# Patient Record
Sex: FEMALE | Race: BLACK OR AFRICAN AMERICAN | Employment: FULL TIME | URBAN - METROPOLITAN AREA
[De-identification: names, ages, dates, MRNs, and addresses within clinical notes are randomized per-mention and may not be internally consistent; named-entity substitution may affect disease eponyms.]

---

## 2017-09-15 PROBLEM — Z87.42 HX OF ABNORMAL CERVICAL PAP SMEAR: Status: ACTIVE | Noted: 2017-09-15

## 2017-09-15 PROBLEM — L91.0 KELOID SCAR: Status: ACTIVE | Noted: 2017-09-15

## 2017-09-15 PROBLEM — L72.3 SEBACEOUS CYST: Status: ACTIVE | Noted: 2017-09-15

## 2017-09-15 PROBLEM — E66.01 MORBID OBESITY DUE TO EXCESS CALORIES (HCC): Status: ACTIVE | Noted: 2017-09-15

## 2018-03-13 ENCOUNTER — TELEPHONE (OUTPATIENT)
Dept: FAMILY MEDICINE CLINIC | Age: 28
End: 2018-03-13

## 2018-03-13 DIAGNOSIS — Z20.828 EXPOSURE TO HERPES: Primary | ICD-10-CM

## 2018-03-20 ENCOUNTER — HOSPITAL ENCOUNTER (OUTPATIENT)
Age: 28
Discharge: HOME | End: 2018-03-20
Payer: COMMERCIAL

## 2018-03-20 DIAGNOSIS — Z20.828 EXPOSURE TO HERPES: ICD-10-CM

## 2018-03-20 PROCEDURE — 86694 HERPES SIMPLEX NES ANTBDY: CPT

## 2018-03-22 LAB — HERPES TYPE I/II IGG COMBINED: 0.23 IV

## 2018-03-26 ENCOUNTER — TELEPHONE (OUTPATIENT)
Dept: FAMILY MEDICINE CLINIC | Age: 28
End: 2018-03-26

## 2018-10-16 ENCOUNTER — OFFICE VISIT (OUTPATIENT)
Dept: FAMILY MEDICINE CLINIC | Age: 28
End: 2018-10-16
Payer: COMMERCIAL

## 2018-10-16 ENCOUNTER — HOSPITAL ENCOUNTER (OUTPATIENT)
Age: 28
Discharge: HOME | End: 2018-10-18
Payer: COMMERCIAL

## 2018-10-16 VITALS
OXYGEN SATURATION: 98 % | DIASTOLIC BLOOD PRESSURE: 85 MMHG | RESPIRATION RATE: 18 BRPM | WEIGHT: 293 LBS | BODY MASS INDEX: 48.82 KG/M2 | SYSTOLIC BLOOD PRESSURE: 133 MMHG | HEIGHT: 65 IN | HEART RATE: 75 BPM

## 2018-10-16 DIAGNOSIS — E66.01 CLASS 3 SEVERE OBESITY DUE TO EXCESS CALORIES WITHOUT SERIOUS COMORBIDITY WITH BODY MASS INDEX (BMI) OF 50.0 TO 59.9 IN ADULT (HCC): ICD-10-CM

## 2018-10-16 DIAGNOSIS — D64.9 ANEMIA, UNSPECIFIED TYPE: ICD-10-CM

## 2018-10-16 DIAGNOSIS — E55.9 VITAMIN D DEFICIENCY: ICD-10-CM

## 2018-10-16 DIAGNOSIS — L72.3 SEBACEOUS CYST: ICD-10-CM

## 2018-10-16 DIAGNOSIS — Z00.00 ANNUAL PHYSICAL EXAM: Primary | ICD-10-CM

## 2018-10-16 LAB
ALBUMIN SERPL-MCNC: 3.9 G/DL (ref 3.5–5.2)
ALP BLD-CCNC: 124 U/L (ref 35–104)
ALT SERPL-CCNC: 9 U/L (ref 0–32)
ANION GAP SERPL CALCULATED.3IONS-SCNC: 11 MMOL/L (ref 7–16)
AST SERPL-CCNC: 23 U/L (ref 0–31)
BASOPHILS ABSOLUTE: 0.02 E9/L (ref 0–0.2)
BASOPHILS RELATIVE PERCENT: 0.3 % (ref 0–2)
BILIRUB SERPL-MCNC: 0.3 MG/DL (ref 0–1.2)
BUN BLDV-MCNC: 14 MG/DL (ref 6–20)
CALCIUM SERPL-MCNC: 9.5 MG/DL (ref 8.6–10.2)
CHLORIDE BLD-SCNC: 103 MMOL/L (ref 98–107)
CHOLESTEROL, TOTAL: 116 MG/DL (ref 0–199)
CO2: 27 MMOL/L (ref 22–29)
CREAT SERPL-MCNC: 0.7 MG/DL (ref 0.5–1)
EOSINOPHILS ABSOLUTE: 0.09 E9/L (ref 0.05–0.5)
EOSINOPHILS RELATIVE PERCENT: 1.1 % (ref 0–6)
GFR AFRICAN AMERICAN: >60
GFR NON-AFRICAN AMERICAN: >60 ML/MIN/1.73
GLUCOSE BLD-MCNC: 85 MG/DL (ref 74–109)
HCT VFR BLD CALC: 37.6 % (ref 34–48)
HDLC SERPL-MCNC: 40 MG/DL
HEMOGLOBIN: 10.8 G/DL (ref 11.5–15.5)
IMMATURE GRANULOCYTES #: 0.04 E9/L
IMMATURE GRANULOCYTES %: 0.5 % (ref 0–5)
IRON SATURATION: 10 % (ref 15–50)
IRON: 33 MCG/DL (ref 37–145)
LDL CHOLESTEROL CALCULATED: 62 MG/DL (ref 0–99)
LYMPHOCYTES ABSOLUTE: 2.48 E9/L (ref 1.5–4)
LYMPHOCYTES RELATIVE PERCENT: 31.5 % (ref 20–42)
MCH RBC QN AUTO: 23.7 PG (ref 26–35)
MCHC RBC AUTO-ENTMCNC: 28.7 % (ref 32–34.5)
MCV RBC AUTO: 82.5 FL (ref 80–99.9)
MONOCYTES ABSOLUTE: 0.47 E9/L (ref 0.1–0.95)
MONOCYTES RELATIVE PERCENT: 6 % (ref 2–12)
NEUTROPHILS ABSOLUTE: 4.78 E9/L (ref 1.8–7.3)
NEUTROPHILS RELATIVE PERCENT: 60.6 % (ref 43–80)
PDW BLD-RTO: 16.2 FL (ref 11.5–15)
PLATELET # BLD: 359 E9/L (ref 130–450)
PMV BLD AUTO: 10.7 FL (ref 7–12)
POTASSIUM SERPL-SCNC: 4.4 MMOL/L (ref 3.5–5)
RBC # BLD: 4.56 E12/L (ref 3.5–5.5)
SODIUM BLD-SCNC: 141 MMOL/L (ref 132–146)
TOTAL IRON BINDING CAPACITY: 344 MCG/DL (ref 250–450)
TOTAL PROTEIN: 8.1 G/DL (ref 6.4–8.3)
TRIGL SERPL-MCNC: 68 MG/DL (ref 0–149)
TSH SERPL DL<=0.05 MIU/L-ACNC: 2.62 UIU/ML (ref 0.27–4.2)
VITAMIN D 25-HYDROXY: 10 NG/ML (ref 30–100)
VLDLC SERPL CALC-MCNC: 14 MG/DL
WBC # BLD: 7.9 E9/L (ref 4.5–11.5)

## 2018-10-16 PROCEDURE — 84443 ASSAY THYROID STIM HORMONE: CPT

## 2018-10-16 PROCEDURE — 99395 PREV VISIT EST AGE 18-39: CPT | Performed by: FAMILY MEDICINE

## 2018-10-16 PROCEDURE — 82306 VITAMIN D 25 HYDROXY: CPT

## 2018-10-16 PROCEDURE — 83540 ASSAY OF IRON: CPT

## 2018-10-16 PROCEDURE — 80061 LIPID PANEL: CPT

## 2018-10-16 PROCEDURE — G8484 FLU IMMUNIZE NO ADMIN: HCPCS | Performed by: FAMILY MEDICINE

## 2018-10-16 PROCEDURE — 80053 COMPREHEN METABOLIC PANEL: CPT

## 2018-10-16 PROCEDURE — 83036 HEMOGLOBIN GLYCOSYLATED A1C: CPT

## 2018-10-16 PROCEDURE — 83550 IRON BINDING TEST: CPT

## 2018-10-16 PROCEDURE — 85025 COMPLETE CBC W/AUTO DIFF WBC: CPT

## 2018-10-16 RX ORDER — MUPIROCIN CALCIUM 20 MG/G
CREAM TOPICAL
Qty: 15 G | Refills: 0 | Status: SHIPPED | OUTPATIENT
Start: 2018-10-16 | End: 2018-11-15

## 2018-10-16 RX ORDER — IBUPROFEN 800 MG/1
800 TABLET ORAL EVERY 8 HOURS PRN
Qty: 90 TABLET | Refills: 1 | Status: SHIPPED | OUTPATIENT
Start: 2018-10-16 | End: 2019-11-15 | Stop reason: SDUPTHER

## 2018-10-16 ASSESSMENT — ENCOUNTER SYMPTOMS
DIARRHEA: 0
CONSTIPATION: 0
ABDOMINAL PAIN: 0
SINUS PRESSURE: 0
COUGH: 0
SHORTNESS OF BREATH: 0
EYE PAIN: 0
SORE THROAT: 0
BACK PAIN: 0

## 2018-10-16 ASSESSMENT — PATIENT HEALTH QUESTIONNAIRE - PHQ9
SUM OF ALL RESPONSES TO PHQ QUESTIONS 1-9: 0
2. FEELING DOWN, DEPRESSED OR HOPELESS: 0
SUM OF ALL RESPONSES TO PHQ QUESTIONS 1-9: 0
SUM OF ALL RESPONSES TO PHQ9 QUESTIONS 1 & 2: 0
1. LITTLE INTEREST OR PLEASURE IN DOING THINGS: 0

## 2018-10-16 NOTE — PROGRESS NOTES
Karishma Urrutia   1990    Chief Complaint:     Chief Complaint   Patient presents with    Established New Doctor       HPI  Karishma Urrutia 33 y.o. who presents as a new patient to establish care. Previous pcp was in [address] [place] [address]. She presents for annual exam.     No previous medical hx. Patient feels well today. She is obese and has been eating one meal daily but does not exercise. She is trying to get back into it. Has sebaceous cyst on back of neck that comes and goes. She did see dr galvin before but does not wish for surgery. Ibuprofen does help. No past medical history on file. Past Surgical History:   Procedure Laterality Date    DILATION AND CURETTAGE OF UTERUS  2017    for abnormal uterine bleeding       Social History     Social History    Marital status: Single     Spouse name: N/A    Number of children: N/A    Years of education: N/A     Social History Main Topics    Smoking status: Never Smoker    Smokeless tobacco: Never Used    Alcohol use No    Drug use: No    Sexual activity: Yes     Partners: Male      Comment: commited relationship     Other Topics Concern    None     Social History Narrative    None       No family history on file. Current Outpatient Prescriptions   Medication Sig Dispense Refill    mupirocin (BACTROBAN) 2 % cream Apply 3 times daily. 15 g 0    ibuprofen (IBU) 800 MG tablet Take 1 tablet by mouth every 8 hours as needed for Pain Take with food. 90 tablet 1     No current facility-administered medications for this visit. No Known Allergies    REVIEW OF SYSTEMS  Review of Systems   Constitutional: Negative for fatigue and fever. HENT: Negative for ear pain, sinus pressure, sneezing and sore throat. Eyes: Negative for pain. Respiratory: Negative for cough and shortness of breath. Cardiovascular: Negative for chest pain and leg swelling. Gastrointestinal: Negative for abdominal pain, constipation and diarrhea.

## 2018-10-17 ENCOUNTER — PATIENT MESSAGE (OUTPATIENT)
Dept: FAMILY MEDICINE CLINIC | Age: 28
End: 2018-10-17

## 2018-10-17 LAB — HBA1C MFR BLD: 5.2 % (ref 4–5.6)

## 2018-10-18 RX ORDER — ERGOCALCIFEROL 1.25 MG/1
50000 CAPSULE ORAL WEEKLY
Qty: 12 CAPSULE | Refills: 0 | Status: SHIPPED | OUTPATIENT
Start: 2018-10-18 | End: 2019-03-22

## 2018-10-18 RX ORDER — FERROUS SULFATE 325(65) MG
325 TABLET ORAL DAILY
Qty: 30 TABLET | Refills: 3 | Status: SHIPPED
Start: 2018-10-18 | End: 2021-03-30

## 2018-11-01 ENCOUNTER — PATIENT MESSAGE (OUTPATIENT)
Dept: FAMILY MEDICINE CLINIC | Age: 28
End: 2018-11-01

## 2018-12-20 ENCOUNTER — OFFICE VISIT (OUTPATIENT)
Dept: FAMILY MEDICINE CLINIC | Age: 28
End: 2018-12-20
Payer: COMMERCIAL

## 2018-12-20 VITALS
HEART RATE: 90 BPM | OXYGEN SATURATION: 100 % | RESPIRATION RATE: 16 BRPM | HEIGHT: 65 IN | TEMPERATURE: 97.1 F | SYSTOLIC BLOOD PRESSURE: 146 MMHG | DIASTOLIC BLOOD PRESSURE: 97 MMHG | WEIGHT: 293 LBS | BODY MASS INDEX: 48.82 KG/M2

## 2018-12-20 DIAGNOSIS — K29.00 ACUTE GASTRITIS WITHOUT HEMORRHAGE, UNSPECIFIED GASTRITIS TYPE: Primary | ICD-10-CM

## 2018-12-20 DIAGNOSIS — E66.01 MORBID OBESITY DUE TO EXCESS CALORIES (HCC): ICD-10-CM

## 2018-12-20 DIAGNOSIS — R10.13 EPIGASTRIC PAIN: ICD-10-CM

## 2018-12-20 PROCEDURE — G8417 CALC BMI ABV UP PARAM F/U: HCPCS | Performed by: FAMILY MEDICINE

## 2018-12-20 PROCEDURE — G8484 FLU IMMUNIZE NO ADMIN: HCPCS | Performed by: FAMILY MEDICINE

## 2018-12-20 PROCEDURE — G8427 DOCREV CUR MEDS BY ELIG CLIN: HCPCS | Performed by: FAMILY MEDICINE

## 2018-12-20 PROCEDURE — 99214 OFFICE O/P EST MOD 30 MIN: CPT | Performed by: FAMILY MEDICINE

## 2018-12-20 PROCEDURE — 1036F TOBACCO NON-USER: CPT | Performed by: FAMILY MEDICINE

## 2018-12-20 RX ORDER — PANTOPRAZOLE SODIUM 40 MG/1
40 TABLET, DELAYED RELEASE ORAL
Qty: 14 TABLET | Refills: 3 | Status: SHIPPED | OUTPATIENT
Start: 2018-12-20 | End: 2019-02-11 | Stop reason: SDUPTHER

## 2018-12-20 ASSESSMENT — ENCOUNTER SYMPTOMS
CONSTIPATION: 0
ABDOMINAL PAIN: 1
SINUS PRESSURE: 0
NAUSEA: 1
COUGH: 0
SORE THROAT: 0
BACK PAIN: 0
SHORTNESS OF BREATH: 0
EYE PAIN: 0
DIARRHEA: 0

## 2019-02-06 ENCOUNTER — OFFICE VISIT (OUTPATIENT)
Dept: FAMILY MEDICINE CLINIC | Age: 29
End: 2019-02-06
Payer: COMMERCIAL

## 2019-02-06 ENCOUNTER — HOSPITAL ENCOUNTER (OUTPATIENT)
Age: 29
Discharge: HOME | End: 2019-02-08
Payer: COMMERCIAL

## 2019-02-06 VITALS
OXYGEN SATURATION: 98 % | SYSTOLIC BLOOD PRESSURE: 140 MMHG | HEIGHT: 65 IN | BODY MASS INDEX: 48.82 KG/M2 | WEIGHT: 293 LBS | TEMPERATURE: 98.4 F | DIASTOLIC BLOOD PRESSURE: 76 MMHG | RESPIRATION RATE: 16 BRPM

## 2019-02-06 DIAGNOSIS — R10.9 FLANK PAIN: Primary | ICD-10-CM

## 2019-02-06 DIAGNOSIS — R09.82 PND (POST-NASAL DRIP): ICD-10-CM

## 2019-02-06 DIAGNOSIS — R10.9 FLANK PAIN: ICD-10-CM

## 2019-02-06 DIAGNOSIS — M72.2 PLANTAR FASCIITIS OF LEFT FOOT: ICD-10-CM

## 2019-02-06 LAB
BACTERIA: ABNORMAL /HPF
BILIRUBIN URINE: NEGATIVE
BLOOD, URINE: NEGATIVE
CLARITY: CLEAR
COLOR: YELLOW
EPITHELIAL CELLS, UA: ABNORMAL /HPF
GLUCOSE URINE: NEGATIVE MG/DL
KETONES, URINE: NEGATIVE MG/DL
LEUKOCYTE ESTERASE, URINE: ABNORMAL
NITRITE, URINE: NEGATIVE
PH UA: 6 (ref 5–9)
PROTEIN UA: NEGATIVE MG/DL
RBC UA: ABNORMAL /HPF (ref 0–2)
SPECIFIC GRAVITY UA: 1.02 (ref 1–1.03)
UROBILINOGEN, URINE: 0.2 E.U./DL
WBC UA: ABNORMAL /HPF (ref 0–5)

## 2019-02-06 PROCEDURE — 99214 OFFICE O/P EST MOD 30 MIN: CPT | Performed by: FAMILY MEDICINE

## 2019-02-06 PROCEDURE — G8427 DOCREV CUR MEDS BY ELIG CLIN: HCPCS | Performed by: FAMILY MEDICINE

## 2019-02-06 PROCEDURE — G8417 CALC BMI ABV UP PARAM F/U: HCPCS | Performed by: FAMILY MEDICINE

## 2019-02-06 PROCEDURE — 81003 URINALYSIS AUTO W/O SCOPE: CPT | Performed by: FAMILY MEDICINE

## 2019-02-06 PROCEDURE — 1036F TOBACCO NON-USER: CPT | Performed by: FAMILY MEDICINE

## 2019-02-06 PROCEDURE — 81001 URINALYSIS AUTO W/SCOPE: CPT

## 2019-02-06 PROCEDURE — G8484 FLU IMMUNIZE NO ADMIN: HCPCS | Performed by: FAMILY MEDICINE

## 2019-02-06 ASSESSMENT — ENCOUNTER SYMPTOMS
COUGH: 0
BACK PAIN: 0
SORE THROAT: 0
EYE PAIN: 0
NAUSEA: 0
SINUS PRESSURE: 0
ABDOMINAL PAIN: 1
DIARRHEA: 0
SHORTNESS OF BREATH: 0
CONSTIPATION: 0

## 2019-02-06 ASSESSMENT — PATIENT HEALTH QUESTIONNAIRE - PHQ9
SUM OF ALL RESPONSES TO PHQ QUESTIONS 1-9: 0
SUM OF ALL RESPONSES TO PHQ QUESTIONS 1-9: 0
2. FEELING DOWN, DEPRESSED OR HOPELESS: 0
SUM OF ALL RESPONSES TO PHQ9 QUESTIONS 1 & 2: 0
1. LITTLE INTEREST OR PLEASURE IN DOING THINGS: 0

## 2019-02-07 RX ORDER — CEPHALEXIN 500 MG/1
500 CAPSULE ORAL 2 TIMES DAILY
Qty: 10 CAPSULE | Refills: 0 | Status: SHIPPED | OUTPATIENT
Start: 2019-02-07 | End: 2019-03-22

## 2019-02-11 RX ORDER — PANTOPRAZOLE SODIUM 40 MG/1
TABLET, DELAYED RELEASE ORAL
Qty: 14 TABLET | Refills: 3 | Status: SHIPPED | OUTPATIENT
Start: 2019-02-11 | End: 2019-03-22

## 2019-03-22 ENCOUNTER — HOSPITAL ENCOUNTER (OUTPATIENT)
Age: 29
Discharge: HOME | End: 2019-03-24
Payer: COMMERCIAL

## 2019-03-22 ENCOUNTER — HOSPITAL ENCOUNTER (OUTPATIENT)
Dept: GENERAL RADIOLOGY | Age: 29
Discharge: HOME | End: 2019-03-24
Payer: COMMERCIAL

## 2019-03-22 ENCOUNTER — OFFICE VISIT (OUTPATIENT)
Dept: FAMILY MEDICINE CLINIC | Age: 29
End: 2019-03-22
Payer: COMMERCIAL

## 2019-03-22 VITALS
WEIGHT: 293 LBS | DIASTOLIC BLOOD PRESSURE: 74 MMHG | HEART RATE: 84 BPM | BODY MASS INDEX: 48.82 KG/M2 | RESPIRATION RATE: 18 BRPM | OXYGEN SATURATION: 95 % | TEMPERATURE: 98.2 F | HEIGHT: 65 IN | SYSTOLIC BLOOD PRESSURE: 134 MMHG

## 2019-03-22 DIAGNOSIS — R10.9 FLANK PAIN: Primary | ICD-10-CM

## 2019-03-22 DIAGNOSIS — R10.9 FLANK PAIN: ICD-10-CM

## 2019-03-22 PROCEDURE — G8427 DOCREV CUR MEDS BY ELIG CLIN: HCPCS | Performed by: FAMILY MEDICINE

## 2019-03-22 PROCEDURE — 74018 RADEX ABDOMEN 1 VIEW: CPT

## 2019-03-22 PROCEDURE — G8417 CALC BMI ABV UP PARAM F/U: HCPCS | Performed by: FAMILY MEDICINE

## 2019-03-22 PROCEDURE — 99213 OFFICE O/P EST LOW 20 MIN: CPT | Performed by: FAMILY MEDICINE

## 2019-03-22 PROCEDURE — 1036F TOBACCO NON-USER: CPT | Performed by: FAMILY MEDICINE

## 2019-03-22 PROCEDURE — G8484 FLU IMMUNIZE NO ADMIN: HCPCS | Performed by: FAMILY MEDICINE

## 2019-03-22 RX ORDER — CYCLOBENZAPRINE HCL 5 MG
5 TABLET ORAL 2 TIMES DAILY PRN
Qty: 30 TABLET | Refills: 0 | Status: SHIPPED | OUTPATIENT
Start: 2019-03-22 | End: 2019-04-01

## 2019-03-26 ASSESSMENT — ENCOUNTER SYMPTOMS
CONSTIPATION: 0
BACK PAIN: 0
SINUS PRESSURE: 0
EYE PAIN: 0
SHORTNESS OF BREATH: 0
COUGH: 0
SORE THROAT: 0
DIARRHEA: 0
NAUSEA: 0
ABDOMINAL PAIN: 0

## 2019-04-24 ENCOUNTER — PATIENT MESSAGE (OUTPATIENT)
Dept: FAMILY MEDICINE CLINIC | Age: 29
End: 2019-04-24

## 2019-04-24 RX ORDER — FLUCONAZOLE 150 MG/1
150 TABLET ORAL ONCE
Qty: 1 TABLET | Refills: 0 | Status: SHIPPED | OUTPATIENT
Start: 2019-04-24 | End: 2019-04-24

## 2019-04-24 NOTE — TELEPHONE ENCOUNTER
From: Goldie Lopez  To: Lesley Poster, DO  Sent: 4/24/2019 11:11 AM EDT  Subject: Prescription Question    Question for the doctor- , I used a new fabric softener which ended up giving me a yeast infection. Im 100 % sure! Doesn't take much to throw me off. . but I'm unable to get an appointment because of my work schedule, I want to see if  can send me a rx for 2 Fluconazole tablets and also 2 pills of Azithromycin. It seems to be the only thing that works for me!      Thanks   Melissa Giles

## 2019-08-01 ENCOUNTER — OFFICE VISIT (OUTPATIENT)
Dept: FAMILY MEDICINE CLINIC | Age: 29
End: 2019-08-01
Payer: COMMERCIAL

## 2019-08-01 ENCOUNTER — HOSPITAL ENCOUNTER (OUTPATIENT)
Age: 29
Discharge: HOME | End: 2019-08-03
Payer: COMMERCIAL

## 2019-08-01 VITALS
BODY MASS INDEX: 48.82 KG/M2 | RESPIRATION RATE: 14 BRPM | WEIGHT: 293 LBS | OXYGEN SATURATION: 98 % | DIASTOLIC BLOOD PRESSURE: 85 MMHG | SYSTOLIC BLOOD PRESSURE: 134 MMHG | HEART RATE: 76 BPM | HEIGHT: 65 IN

## 2019-08-01 DIAGNOSIS — T14.8XXA BRUISING: Primary | ICD-10-CM

## 2019-08-01 DIAGNOSIS — T14.8XXA BRUISING: ICD-10-CM

## 2019-08-01 LAB
BASOPHILS ABSOLUTE: 0.02 E9/L (ref 0–0.2)
BASOPHILS RELATIVE PERCENT: 0.3 % (ref 0–2)
EOSINOPHILS ABSOLUTE: 0.09 E9/L (ref 0.05–0.5)
EOSINOPHILS RELATIVE PERCENT: 1.2 % (ref 0–6)
HCT VFR BLD CALC: 37.6 % (ref 34–48)
HEMOGLOBIN: 11.2 G/DL (ref 11.5–15.5)
IMMATURE GRANULOCYTES #: 0.02 E9/L
IMMATURE GRANULOCYTES %: 0.3 % (ref 0–5)
LYMPHOCYTES ABSOLUTE: 2.16 E9/L (ref 1.5–4)
LYMPHOCYTES RELATIVE PERCENT: 28.7 % (ref 20–42)
MCH RBC QN AUTO: 24.6 PG (ref 26–35)
MCHC RBC AUTO-ENTMCNC: 29.8 % (ref 32–34.5)
MCV RBC AUTO: 82.5 FL (ref 80–99.9)
MONOCYTES ABSOLUTE: 0.38 E9/L (ref 0.1–0.95)
MONOCYTES RELATIVE PERCENT: 5 % (ref 2–12)
NEUTROPHILS ABSOLUTE: 4.86 E9/L (ref 1.8–7.3)
NEUTROPHILS RELATIVE PERCENT: 64.5 % (ref 43–80)
PDW BLD-RTO: 15.9 FL (ref 11.5–15)
PLATELET # BLD: 323 E9/L (ref 130–450)
PMV BLD AUTO: 10.7 FL (ref 7–12)
RBC # BLD: 4.56 E12/L (ref 3.5–5.5)
WBC # BLD: 7.5 E9/L (ref 4.5–11.5)

## 2019-08-01 PROCEDURE — G8417 CALC BMI ABV UP PARAM F/U: HCPCS | Performed by: FAMILY MEDICINE

## 2019-08-01 PROCEDURE — G8427 DOCREV CUR MEDS BY ELIG CLIN: HCPCS | Performed by: FAMILY MEDICINE

## 2019-08-01 PROCEDURE — 99213 OFFICE O/P EST LOW 20 MIN: CPT | Performed by: FAMILY MEDICINE

## 2019-08-01 PROCEDURE — 85025 COMPLETE CBC W/AUTO DIFF WBC: CPT

## 2019-08-01 PROCEDURE — 1036F TOBACCO NON-USER: CPT | Performed by: FAMILY MEDICINE

## 2019-08-01 ASSESSMENT — ENCOUNTER SYMPTOMS
SORE THROAT: 0
SHORTNESS OF BREATH: 0
DIARRHEA: 0
SINUS PRESSURE: 0
CONSTIPATION: 0
EYE PAIN: 0
ABDOMINAL PAIN: 0
COUGH: 0
BACK PAIN: 0

## 2019-08-01 NOTE — PROGRESS NOTES
Jarod Jacobson  : 1990    Chief Complaint:     Chief Complaint   Patient presents with    Bleeding/Bruising     right leg       HPI  Jarod Jacobson 30 y.o. presents for   Chief Complaint   Patient presents with    Bleeding/Bruising     right leg     She admits to bruising on her right leg that is been there for about 3 months. She states she does not remember hitting this. She has no other bruises anywhere else. She has never had issues with this before. She is concerned and she wants this to go away. This is not painful. There is no family history of bruising or bleeding. All questions were answered to patients satisfaction. History reviewed. No pertinent past medical history.     Past Surgical History:   Procedure Laterality Date    DILATION AND CURETTAGE OF UTERUS  2017    for abnormal uterine bleeding       Social History     Socioeconomic History    Marital status: Single     Spouse name: None    Number of children: None    Years of education: None    Highest education level: None   Occupational History    None   Social Needs    Financial resource strain: None    Food insecurity:     Worry: None     Inability: None    Transportation needs:     Medical: None     Non-medical: None   Tobacco Use    Smoking status: Never Smoker    Smokeless tobacco: Never Used   Substance and Sexual Activity    Alcohol use: No    Drug use: No    Sexual activity: Yes     Partners: Male     Comment: commited relationship   Lifestyle    Physical activity:     Days per week: None     Minutes per session: None    Stress: None   Relationships    Social connections:     Talks on phone: None     Gets together: None     Attends Latter day service: None     Active member of club or organization: None     Attends meetings of clubs or organizations: None     Relationship status: None    Intimate partner violence:     Fear of current or ex partner: None     Emotionally abused: None

## 2019-08-01 NOTE — PATIENT INSTRUCTIONS
Patient Education        Superficial Thrombophlebitis: Care Instructions  Your Care Instructions  Superficial thrombophlebitis is inflammation in a vein where a blood clot has formed close to the surface of the skin. You may be able to feel the clot as a firm lump under the skin. The skin over the clot can become red, tender, and warm to the touch. Blood clots in veins close to the skin's surface usually are not serious and often can be treated at home. Sometimes superficial thrombophlebitis spreads to a deeper vein (deep vein thrombosis, or DVT). These deeper clots can be serious, even life-threatening. It is very important that you follow your doctor's instructions, keep all follow-up appointments, and watch for new or worsening symptoms of a clot. Follow-up care is a key part of your treatment and safety. Be sure to make and go to all appointments, and call your doctor if you are having problems. It's also a good idea to know your test results and keep a list of the medicines you take. How can you care for yourself at home? · Take your medicines exactly as prescribed. Call your doctor if you think you are having a problem with your medicine. You will get more details on the specific medicines your doctor prescribes. · Prop up the sore leg or arm on a pillow anytime you sit or lie down. Try to keep it above the level of your heart. To prevent thrombophlebitis  · Exercise. Keep blood moving in your legs to keep new clots from forming. · Get up out of bed as soon as possible after an illness or surgery. · Do not smoke. If you need help quitting, talk to your doctor about stop-smoking programs and medicines. These can increase your chances of quitting for good. · Ask your doctor about compression stockings. These may help prevent blood clots from forming in your legs. But there are different types of stockings, and they need to fit right. So your doctor will recommend what you need.   When should you call for

## 2019-08-02 LAB — PATHOLOGIST REVIEW: NORMAL

## 2019-09-01 ENCOUNTER — HOSPITAL ENCOUNTER (EMERGENCY)
Age: 29
Discharge: HOME | End: 2019-09-01
Payer: COMMERCIAL

## 2019-09-01 VITALS
WEIGHT: 293 LBS | DIASTOLIC BLOOD PRESSURE: 94 MMHG | TEMPERATURE: 98.2 F | SYSTOLIC BLOOD PRESSURE: 187 MMHG | HEIGHT: 65 IN | RESPIRATION RATE: 16 BRPM | BODY MASS INDEX: 48.82 KG/M2 | HEART RATE: 96 BPM | OXYGEN SATURATION: 99 %

## 2019-09-01 DIAGNOSIS — N93.8 DUB (DYSFUNCTIONAL UTERINE BLEEDING): Primary | ICD-10-CM

## 2019-09-01 LAB
BASOPHILS ABSOLUTE: 0.03 E9/L (ref 0–0.2)
BASOPHILS RELATIVE PERCENT: 0.3 % (ref 0–2)
EOSINOPHILS ABSOLUTE: 0.09 E9/L (ref 0.05–0.5)
EOSINOPHILS RELATIVE PERCENT: 0.9 % (ref 0–6)
HCT VFR BLD CALC: 33 % (ref 34–48)
HEMOGLOBIN: 10.1 G/DL (ref 11.5–15.5)
IMMATURE GRANULOCYTES #: 0.03 E9/L
IMMATURE GRANULOCYTES %: 0.3 % (ref 0–5)
LYMPHOCYTES ABSOLUTE: 3.49 E9/L (ref 1.5–4)
LYMPHOCYTES RELATIVE PERCENT: 33.9 % (ref 20–42)
MCH RBC QN AUTO: 24.9 PG (ref 26–35)
MCHC RBC AUTO-ENTMCNC: 30.6 % (ref 32–34.5)
MCV RBC AUTO: 81.5 FL (ref 80–99.9)
MONOCYTES ABSOLUTE: 0.6 E9/L (ref 0.1–0.95)
MONOCYTES RELATIVE PERCENT: 5.8 % (ref 2–12)
NEUTROPHILS ABSOLUTE: 6.04 E9/L (ref 1.8–7.3)
NEUTROPHILS RELATIVE PERCENT: 58.8 % (ref 43–80)
PDW BLD-RTO: 15.8 FL (ref 11.5–15)
PLATELET # BLD: 303 E9/L (ref 130–450)
PMV BLD AUTO: 9.9 FL (ref 7–12)
RBC # BLD: 4.05 E12/L (ref 3.5–5.5)
WBC # BLD: 10.3 E9/L (ref 4.5–11.5)

## 2019-09-01 PROCEDURE — 99284 EMERGENCY DEPT VISIT MOD MDM: CPT

## 2019-09-01 PROCEDURE — 85025 COMPLETE CBC W/AUTO DIFF WBC: CPT

## 2019-09-02 NOTE — ED PROVIDER NOTES
Making:    Her symptoms remain completely resolved. CBC was evaluated her hemoglobin is normal.  She was provided with information to follow-up with OB/GYN and advised to follow-up with    Counseling: The emergency provider has spoken with the patient and discussed todays results, in addition to providing specific details for the plan of care and counseling regarding the diagnosis and prognosis.   Questions are answered at this time and they are agreeable with the plan.      --------------------------------- IMPRESSION AND DISPOSITION ---------------------------------    IMPRESSION  1. DUB (dysfunctional uterine bleeding)        DISPOSITION  Disposition: Discharge to home  Patient condition is good                 RICHARD Wang CNP  09/02/19 0457

## 2019-09-06 ENCOUNTER — OFFICE VISIT (OUTPATIENT)
Dept: FAMILY MEDICINE CLINIC | Age: 29
End: 2019-09-06
Payer: COMMERCIAL

## 2019-09-06 VITALS
DIASTOLIC BLOOD PRESSURE: 64 MMHG | RESPIRATION RATE: 14 BRPM | HEART RATE: 84 BPM | OXYGEN SATURATION: 99 % | SYSTOLIC BLOOD PRESSURE: 118 MMHG | BODY MASS INDEX: 48.82 KG/M2 | HEIGHT: 65 IN | WEIGHT: 293 LBS

## 2019-09-06 DIAGNOSIS — E66.01 MORBID OBESITY DUE TO EXCESS CALORIES (HCC): ICD-10-CM

## 2019-09-06 DIAGNOSIS — R03.0 ELEVATED BP WITHOUT DIAGNOSIS OF HYPERTENSION: Primary | ICD-10-CM

## 2019-09-06 PROCEDURE — G8427 DOCREV CUR MEDS BY ELIG CLIN: HCPCS | Performed by: FAMILY MEDICINE

## 2019-09-06 PROCEDURE — G8417 CALC BMI ABV UP PARAM F/U: HCPCS | Performed by: FAMILY MEDICINE

## 2019-09-06 PROCEDURE — 1036F TOBACCO NON-USER: CPT | Performed by: FAMILY MEDICINE

## 2019-09-06 PROCEDURE — 99213 OFFICE O/P EST LOW 20 MIN: CPT | Performed by: FAMILY MEDICINE

## 2019-09-06 ASSESSMENT — ENCOUNTER SYMPTOMS
SORE THROAT: 0
SINUS PRESSURE: 0
CONSTIPATION: 0
EYE PAIN: 0
ABDOMINAL PAIN: 0
SHORTNESS OF BREATH: 0
DIARRHEA: 0
BACK PAIN: 0
COUGH: 0

## 2019-09-06 NOTE — PROGRESS NOTES
reviewed. No pertinent family history. Current Outpatient Medications   Medication Sig Dispense Refill    ferrous sulfate (SHARI-JIMMIE) 325 (65 Fe) MG tablet Take 1 tablet by mouth daily 30 tablet 3    ibuprofen (IBU) 800 MG tablet Take 1 tablet by mouth every 8 hours as needed for Pain Take with food. 90 tablet 1     No current facility-administered medications for this visit. No Known Allergies    Health Maintenance Due   Topic Date Due    Varicella Vaccine (1 of 2 - 13+ 2-dose series) 04/01/2004    HIV screen  04/01/2006    DTaP/Tdap/Td vaccine (1 - Tdap) 04/01/2010    Flu vaccine (1) 09/01/2019           REVIEW OF SYSTEMS  Review of Systems   Constitutional: Negative for fatigue and fever. HENT: Negative for ear pain, sinus pressure, sneezing and sore throat. Eyes: Negative for pain. Respiratory: Negative for cough and shortness of breath. Cardiovascular: Negative for chest pain and leg swelling. Gastrointestinal: Negative for abdominal pain, constipation and diarrhea. Genitourinary: Negative for dysuria and urgency. Musculoskeletal: Negative for back pain and myalgias. Skin: Negative for rash. Allergic/Immunologic: Negative for food allergies. Neurological: Negative for light-headedness and headaches. Hematological: Does not bruise/bleed easily. Psychiatric/Behavioral: Negative for behavioral problems and sleep disturbance. PHYSICAL EXAM  /64 (Site: Left Lower Arm)   Pulse 84   Resp 14   Ht 5' 5\" (1.651 m)   Wt (!) 350 lb (158.8 kg)   SpO2 99%   BMI 58.24 kg/m²   Physical Exam   Constitutional: She is oriented to person, place, and time. She appears well-developed and well-nourished. obese   HENT:   Head: Normocephalic and atraumatic. Right Ear: External ear normal.   Left Ear: External ear normal.   Nose: Nose normal.   Mouth/Throat: Oropharynx is clear and moist.   Eyes: Conjunctivae and EOM are normal.   Neck: Normal range of motion.  Neck supple. Cardiovascular: Normal rate, regular rhythm and normal heart sounds. Exam reveals no gallop and no friction rub. No murmur heard. Pulmonary/Chest: Effort normal and breath sounds normal. She has no wheezes. Abdominal: Soft. She exhibits no mass. There is no tenderness. There is no rebound and no guarding. Musculoskeletal: Normal range of motion. She exhibits no edema or tenderness. Neurological: She is alert and oriented to person, place, and time. She has normal reflexes. Skin: Skin is warm and dry. No rash noted. Psychiatric: She has a normal mood and affect. Her behavior is normal.   Nursing note and vitals reviewed. Laboratory: All laboratory and radiology results have been personally reviewed by myself    Lab Results   Component Value Date     10/16/2018    K 4.4 10/16/2018     10/16/2018    CO2 27 10/16/2018    BUN 14 10/16/2018    CREATININE 0.7 10/16/2018    PROT 8.1 10/16/2018    LABALBU 3.9 10/16/2018    CALCIUM 9.5 10/16/2018    GFRAA >60 10/16/2018    LABGLOM >60 10/16/2018    GLUCOSE 85 10/16/2018    AST 23 10/16/2018    ALT 9 10/16/2018    ALKPHOS 124 10/16/2018    BILITOT 0.3 10/16/2018    TSH 2.620 10/16/2018    VITD25 10 10/16/2018    CHOL 116 10/16/2018    TRIG 68 10/16/2018    HDL 40 10/16/2018    LDLCALC 62 10/16/2018    LABA1C 5.2 10/16/2018        Lab Results   Component Value Date    CHOL 116 10/16/2018     Lab Results   Component Value Date    TRIG 68 10/16/2018     Lab Results   Component Value Date    HDL 40 10/16/2018     Lab Results   Component Value Date    LDLCALC 62 10/16/2018       Lab Results   Component Value Date    LABA1C 5.2 10/16/2018     Lab Results   Component Value Date    LDLCALC 62 10/16/2018    CREATININE 0.7 10/16/2018       ASSESSMENT/PLAN:     Diagnosis Orders   1. Elevated BP without diagnosis of hypertension     2. Morbid obesity due to excess calories ([address]       Doing well overall.   Blood pressure is well controlled

## 2019-11-15 ENCOUNTER — PATIENT MESSAGE (OUTPATIENT)
Dept: FAMILY MEDICINE CLINIC | Age: 29
End: 2019-11-15

## 2019-11-15 RX ORDER — IBUPROFEN 800 MG/1
800 TABLET ORAL EVERY 8 HOURS PRN
Qty: 90 TABLET | Refills: 1 | Status: SHIPPED | OUTPATIENT
Start: 2019-11-15 | End: 2020-01-14

## 2020-01-14 RX ORDER — IBUPROFEN 800 MG/1
TABLET ORAL
Qty: 90 TABLET | Refills: 1 | Status: SHIPPED
Start: 2020-01-14 | End: 2020-08-14 | Stop reason: SDUPTHER

## 2020-02-07 ENCOUNTER — HOSPITAL ENCOUNTER (OUTPATIENT)
Age: 30
Discharge: HOME | End: 2020-02-09

## 2020-02-07 ENCOUNTER — TELEPHONE (OUTPATIENT)
Dept: FAMILY MEDICINE CLINIC | Age: 30
End: 2020-02-07

## 2020-02-07 PROCEDURE — 86787 VARICELLA-ZOSTER ANTIBODY: CPT

## 2020-02-11 LAB — VARICELLA-ZOSTER VIRUS AB, IGG: NORMAL

## 2020-03-04 ENCOUNTER — OFFICE VISIT (OUTPATIENT)
Dept: FAMILY MEDICINE CLINIC | Age: 30
End: 2020-03-04
Payer: COMMERCIAL

## 2020-03-04 VITALS
BODY MASS INDEX: 48.82 KG/M2 | SYSTOLIC BLOOD PRESSURE: 136 MMHG | TEMPERATURE: 98.1 F | RESPIRATION RATE: 16 BRPM | HEART RATE: 78 BPM | HEIGHT: 65 IN | DIASTOLIC BLOOD PRESSURE: 76 MMHG | OXYGEN SATURATION: 98 % | WEIGHT: 293 LBS

## 2020-03-04 PROCEDURE — G8484 FLU IMMUNIZE NO ADMIN: HCPCS | Performed by: FAMILY MEDICINE

## 2020-03-04 PROCEDURE — 1036F TOBACCO NON-USER: CPT | Performed by: FAMILY MEDICINE

## 2020-03-04 PROCEDURE — G8427 DOCREV CUR MEDS BY ELIG CLIN: HCPCS | Performed by: FAMILY MEDICINE

## 2020-03-04 PROCEDURE — 99214 OFFICE O/P EST MOD 30 MIN: CPT | Performed by: FAMILY MEDICINE

## 2020-03-04 PROCEDURE — G8417 CALC BMI ABV UP PARAM F/U: HCPCS | Performed by: FAMILY MEDICINE

## 2020-03-04 ASSESSMENT — ENCOUNTER SYMPTOMS
DIARRHEA: 0
COUGH: 0
EYE PAIN: 0
BACK PAIN: 0
SINUS PRESSURE: 0
ABDOMINAL PAIN: 0
SORE THROAT: 0
CONSTIPATION: 0
SHORTNESS OF BREATH: 0

## 2020-03-04 ASSESSMENT — PATIENT HEALTH QUESTIONNAIRE - PHQ9
SUM OF ALL RESPONSES TO PHQ9 QUESTIONS 1 & 2: 0
1. LITTLE INTEREST OR PLEASURE IN DOING THINGS: 0
2. FEELING DOWN, DEPRESSED OR HOPELESS: 0
SUM OF ALL RESPONSES TO PHQ QUESTIONS 1-9: 0
SUM OF ALL RESPONSES TO PHQ QUESTIONS 1-9: 0

## 2020-03-04 NOTE — PROGRESS NOTES
Chantal Montiel  : 1990    Chief Complaint:     Chief Complaint   Patient presents with    Skin Problem     bruises on both legs/she is not taking the iron pills she said you stopped that a while ago but was not taking them all the time        HPI  Chantal Montiel 30 y.o. presents for   Chief Complaint   Patient presents with    Skin Problem     bruises on both legs/she is not taking the iron pills she said you stopped that a while ago but was not taking them all the time      Continues to be concerned about bruising on her lower extremities. She states she has had these for some time. She does not like the appearance of them. She wishes to be referred to a dermatologist to further evaluate. She also has not been taking her iron supplementation. She did have iron deficiency anemia on last labs. Has gained 7 pounds over the past 6 months. She has been trying to watch her diet. She has not been exercising. All questions were answered to patients satisfaction. No past medical history on file.     Past Surgical History:   Procedure Laterality Date    DILATION AND CURETTAGE OF UTERUS  2017    for abnormal uterine bleeding       Social History     Socioeconomic History    Marital status: Single     Spouse name: None    Number of children: None    Years of education: None    Highest education level: None   Occupational History    None   Social Needs    Financial resource strain: None    Food insecurity:     Worry: None     Inability: None    Transportation needs:     Medical: None     Non-medical: None   Tobacco Use    Smoking status: Never Smoker    Smokeless tobacco: Never Used   Substance and Sexual Activity    Alcohol use: No    Drug use: No    Sexual activity: Yes     Partners: Male     Comment: commited relationship   Lifestyle    Physical activity:     Days per week: None     Minutes per session: None    Stress: None   Relationships    Social connections: Talks on phone: None     Gets together: None     Attends Taoist service: None     Active member of club or organization: None     Attends meetings of clubs or organizations: None     Relationship status: None    Intimate partner violence:     Fear of current or ex partner: None     Emotionally abused: None     Physically abused: None     Forced sexual activity: None   Other Topics Concern    None   Social History Narrative    None       No family history on file. Current Outpatient Medications   Medication Sig Dispense Refill    ibuprofen (ADVIL;MOTRIN) 800 MG tablet TAKE 1 TABLET BY MOUTH EVERY 8 HOURS WITH FOOD AS NEEDED FOR PAIN 90 tablet 1    ferrous sulfate (SHARI-JIMMIE) 325 (65 Fe) MG tablet Take 1 tablet by mouth daily 30 tablet 3     No current facility-administered medications for this visit. No Known Allergies    Health Maintenance Due   Topic Date Due    Varicella vaccine (1 of 2 - 2-dose childhood series) 04/01/1992    DTaP/Tdap/Td vaccine (1 - Tdap) 04/01/2002    HIV screen  04/01/2006    Flu vaccine (1) 09/01/2019           REVIEW OF SYSTEMS  Review of Systems   Constitutional: Negative for fatigue and fever. HENT: Negative for ear pain, sinus pressure, sneezing and sore throat. Eyes: Negative for pain. Respiratory: Negative for cough and shortness of breath. Cardiovascular: Negative for chest pain and leg swelling. Gastrointestinal: Negative for abdominal pain, constipation and diarrhea. Genitourinary: Negative for dysuria and urgency. Musculoskeletal: Negative for back pain and myalgias. Skin: Negative for rash. Allergic/Immunologic: Negative for food allergies. Neurological: Negative for light-headedness and headaches. Hematological: Bruises/bleeds easily. Psychiatric/Behavioral: Negative for behavioral problems and sleep disturbance.        PHYSICAL EXAM  /76 (Site: Left Upper Arm, Position: Sitting, Cuff Size: Medium Adult)   Pulse 78   Temp HDL 40 10/16/2018    LDLCALC 62 10/16/2018    LABA1C 5.2 10/16/2018        Lab Results   Component Value Date    CHOL 116 10/16/2018     Lab Results   Component Value Date    TRIG 68 10/16/2018     Lab Results   Component Value Date    HDL 40 10/16/2018     Lab Results   Component Value Date    LDLCALC 62 10/16/2018       Lab Results   Component Value Date    LABA1C 5.2 10/16/2018     Lab Results   Component Value Date    LDLCALC 62 10/16/2018    CREATININE 0.7 10/16/2018       ASSESSMENT/PLAN:     Diagnosis Orders   1. Iron deficiency anemia, unspecified iron deficiency anemia type  CBC Auto Differential    Iron and TIBC    FERRITIN   2. Leg lesion  AFL - Rico Reeder MD, Dermatology, [place]   3. Morbid obesity due to excess calories (HCC)       CBC, iron and TIBC and ferritin ordered today. Referral placed to dermatology per patient preference. Did discuss diet and exercise in detail today and the need to lose weight for better health. Patient understands. Problem list reviewed andsimplified/updated  HM reviewed today and counseled as appropriate    Call or go to ED immediately if symptoms worsen or persist.  No future appointments. Or sooner if necessary.       Educational materials and/or homeexercises printed for patient's review and were included in patient instructions on his/her After Visit Summary and given to patient at the end of visit.       Counseled regarding above diagnosis, including possible risks and complications,  especially if left uncontrolled.     Counseled regarding the possible side effects, risks, benefits and alternatives to treatment; patient and/or guardian verbalizes understanding, agrees,feels comfortable with and wishes to proceed with above treatment plan.     Advised patient to call [facility] new medication issues, and read all Rx info from pharmacy to assure aware of all possible risks and side effects of medication before taking.     Reviewed age and gender appropriate

## 2020-03-05 ENCOUNTER — PATIENT MESSAGE (OUTPATIENT)
Dept: FAMILY MEDICINE CLINIC | Age: 30
End: 2020-03-05

## 2020-03-05 RX ORDER — FLUCONAZOLE 150 MG/1
150 TABLET ORAL ONCE
Qty: 1 TABLET | Refills: 0 | Status: SHIPPED
Start: 2020-03-05 | End: 2020-04-07 | Stop reason: SDUPTHER

## 2020-03-05 NOTE — TELEPHONE ENCOUNTER
From: Chantal Kent  To: Lindsey 5, DO  Sent: 3/5/2020 2:46 PM EST  Subject: Prescription Question    Hello I forgot to mention yesterday about getting a prescription for antibiotic from the doctor. I used the wrong detergent in clothes and have a yeast infection. If she could send over something to my pharmacy at [facility] on [address] [facility] [address] appreciate it very much!

## 2020-03-12 ENCOUNTER — TELEPHONE (OUTPATIENT)
Dept: FAMILY MEDICINE CLINIC | Age: 30
End: 2020-03-12

## 2020-03-12 NOTE — TELEPHONE ENCOUNTER
Called pt and LVM for her to call us back in regards to apt she made on mychart at 230 for BP.  Pt was called because doctor has questions regarding how her BP has actually been and if she really needs this apt

## 2020-04-07 ENCOUNTER — TELEPHONE (OUTPATIENT)
Dept: FAMILY MEDICINE CLINIC | Age: 30
End: 2020-04-07

## 2020-04-07 RX ORDER — FLUCONAZOLE 150 MG/1
150 TABLET ORAL ONCE
Qty: 1 TABLET | Refills: 0 | Status: SHIPPED | OUTPATIENT
Start: 2020-04-07 | End: 2020-04-07

## 2020-06-04 ENCOUNTER — PATIENT MESSAGE (OUTPATIENT)
Dept: FAMILY MEDICINE CLINIC | Age: 30
End: 2020-06-04

## 2020-06-05 RX ORDER — FLUCONAZOLE 150 MG/1
150 TABLET ORAL ONCE
Qty: 2 TABLET | Refills: 0 | Status: SHIPPED | OUTPATIENT
Start: 2020-06-05 | End: 2020-06-05

## 2020-08-14 RX ORDER — IBUPROFEN 800 MG/1
800 TABLET ORAL EVERY 8 HOURS PRN
Qty: 90 TABLET | Refills: 1 | Status: SHIPPED
Start: 2020-08-14 | End: 2020-11-30 | Stop reason: SDUPTHER

## 2020-08-17 ENCOUNTER — HOSPITAL ENCOUNTER (OUTPATIENT)
Dept: ULTRASOUND IMAGING | Age: 30
Discharge: HOME | End: 2020-08-19
Payer: COMMERCIAL

## 2020-08-17 PROCEDURE — 76856 US EXAM PELVIC COMPLETE: CPT

## 2020-08-17 PROCEDURE — 76830 TRANSVAGINAL US NON-OB: CPT

## 2020-11-17 ENCOUNTER — TELEPHONE (OUTPATIENT)
Dept: FAMILY MEDICINE CLINIC | Age: 30
End: 2020-11-17

## 2020-11-17 NOTE — TELEPHONE ENCOUNTER
Patient called no answer, left VM to return office call, also sent patient a mychart message per PCP note

## 2020-12-01 RX ORDER — IBUPROFEN 800 MG/1
800 TABLET ORAL EVERY 8 HOURS PRN
Qty: 90 TABLET | Refills: 1 | Status: SHIPPED
Start: 2020-12-01 | End: 2021-05-10

## 2021-02-19 ENCOUNTER — PATIENT MESSAGE (OUTPATIENT)
Dept: FAMILY MEDICINE CLINIC | Age: 31
End: 2021-02-19

## 2021-02-22 NOTE — TELEPHONE ENCOUNTER
From: Sherryle Cash  To: Radhika Lyman DO  Sent: 2/19/2021 5:24 PM EST  Subject: Prescription Question    Hello, recently I've developed a hemorrhoid , it can be bothersome. I would like it I could have a cream or ointment to help this go away. These can go away right? My pharmacy is [facility] on [de-identified].

## 2021-03-25 ENCOUNTER — PATIENT MESSAGE (OUTPATIENT)
Dept: FAMILY MEDICINE CLINIC | Age: 31
End: 2021-03-25

## 2021-03-30 ENCOUNTER — VIRTUAL VISIT (OUTPATIENT)
Dept: FAMILY MEDICINE CLINIC | Age: 31
End: 2021-03-30
Payer: COMMERCIAL

## 2021-03-30 DIAGNOSIS — N92.6 MENSTRUAL PERIODS, ABNORMAL: Primary | ICD-10-CM

## 2021-03-30 PROCEDURE — G8427 DOCREV CUR MEDS BY ELIG CLIN: HCPCS | Performed by: FAMILY MEDICINE

## 2021-03-30 PROCEDURE — 99212 OFFICE O/P EST SF 10 MIN: CPT | Performed by: FAMILY MEDICINE

## 2021-03-30 RX ORDER — DROSPIRENONE AND ETHINYL ESTRADIOL 0.02-3(28)
KIT ORAL
COMMUNITY
Start: 2021-03-14 | End: 2021-05-10

## 2021-03-30 SDOH — ECONOMIC STABILITY: FOOD INSECURITY: WITHIN THE PAST 12 MONTHS, YOU WORRIED THAT YOUR FOOD WOULD RUN OUT BEFORE YOU GOT MONEY TO BUY MORE.: NEVER TRUE

## 2021-03-30 SDOH — ECONOMIC STABILITY: INCOME INSECURITY: HOW HARD IS IT FOR YOU TO PAY FOR THE VERY BASICS LIKE FOOD, HOUSING, MEDICAL CARE, AND HEATING?: NOT HARD AT ALL

## 2021-03-30 SDOH — ECONOMIC STABILITY: TRANSPORTATION INSECURITY
IN THE PAST 12 MONTHS, HAS LACK OF TRANSPORTATION KEPT YOU FROM MEETINGS, WORK, OR FROM GETTING THINGS NEEDED FOR DAILY LIVING?: NOT ASKED

## 2021-03-30 ASSESSMENT — PATIENT HEALTH QUESTIONNAIRE - PHQ9
SUM OF ALL RESPONSES TO PHQ QUESTIONS 1-9: 0
SUM OF ALL RESPONSES TO PHQ QUESTIONS 1-9: 0
2. FEELING DOWN, DEPRESSED OR HOPELESS: 0
1. LITTLE INTEREST OR PLEASURE IN DOING THINGS: 0

## 2021-03-30 NOTE — PROGRESS NOTES
This visit was performed as a virtual video visit using a synchronous, two-way, audio-video telehealth technology platform. Patient identification was verified at the start of the visit, including the patient's telephone number and physical location. I discussed with the patient the nature of our telehealth visits, that:     1. Due to the nature of an audio- video modality, the only components of a physical exam that could be done are the elements supported by direct observation. 2. I would evaluate the patient and recommend diagnostics and treatments based on my assessment. 3. If it was felt that the patient should be evaluated in clinic or an emergency room setting, then they would be directed there. 4. Our sessions are not being recorded and that personal health information is protected. 5. Our team would provide follow up care in person if/when the patient needs it. Patient does agree to proceed with telemedicine consultation. Patient's location: other address in [address] [place] [address] Dr araiza    Physician  location [place] office   other people involved in call: none        Time spent: Greater than Not billed by time    This visit was completed virtually using D[employer]. me        Vickie Bond  : 1990    Chief Complaint:     Chief Complaint   Patient presents with    Rectal Pain     while on period       [facility]  Vickie Bond 35 y.o. presents for   Chief Complaint   Patient presents with    Rectal Pain     while on period     Patient states that while she is on her menstrual she has developed cramping in her rectal area but only while she is on her menstrual cycle. The pain is now gone away. She is on birth control but has not seen a gynecologist since the summer. She did have an ultrasound that and it was normal.  She states she was on her menstrual for 2 weeks.   She states this is not the first time that she has had this pain on her menstrual period    All questions were answered to patients satisfaction. No past medical history on file. Past Surgical History:   Procedure Laterality Date    DILATION AND CURETTAGE OF UTERUS  07/2017    for abnormal uterine bleeding       Social History     Socioeconomic History    Marital status: Single     Spouse name: None    Number of children: None    Years of education: None    Highest education level: None   Occupational History    None   Social Needs    Financial resource strain: Not hard at all   Tramaine-Annamaria insecurity     Worry: Never true     Inability: Never true   [employer] needs     Medical: None     Non-medical: None   Tobacco Use    Smoking status: Never Smoker    Smokeless tobacco: Never Used   Substance and Sexual Activity    Alcohol use: No    Drug use: No    Sexual activity: Yes     Partners: Male     Comment: commited relationship   Lifestyle    Physical activity     Days per week: None     Minutes per session: None    Stress: None   Relationships    Social connections     Talks on phone: None     Gets together: None     Attends [employer] service: None     Active member of club or organization: None     Attends meetings of clubs or organizations: None     Relationship status: None    Intimate partner violence     Fear of current or ex partner: None     Emotionally abused: None     Physically abused: None     Forced sexual activity: None   Other Topics Concern    None   Social History Narrative    None       No family history on file. Current Outpatient Medications   Medication Sig Dispense Refill    drospirenone-ethinyl estradiol (CHELA) 3-0.02 MG per tablet take 1 tablet by mouth daily      ibuprofen (ADVIL;MOTRIN) 800 MG tablet Take 1 tablet by mouth every 8 hours as needed for Pain 90 tablet 1     No current facility-administered medications for this visit.         No Known Allergies    Health Maintenance Due   Topic Date Due    Hepatitis C screen  Never done    Varicella vaccine (1 of 2 - 2-dose childhood series) Never done    HIV screen  Never done    COVID-19 Vaccine (1) Never done    DTaP/Tdap/Td vaccine (1 - Tdap) Never done    Cervical cancer screen  04/10/2020    Flu vaccine (1) Never done           REVIEW OF SYSTEMS  Review of Systems   Genitourinary: Positive for menstrual problem. PHYSICAL EXAM  There were no vitals taken for this visit. Physical Exam  Vitals signs reviewed. Constitutional:       Appearance: Normal appearance. She is obese. HENT:      Head: Normocephalic. Nose: Nose normal.   Eyes:      Extraocular Movements: Extraocular movements intact. Conjunctiva/sclera: Conjunctivae normal.   Neck:      Musculoskeletal: Normal range of motion. Pulmonary:      Effort: Pulmonary effort is normal.   Musculoskeletal: Normal range of motion. Skin:     Findings: No rash. Neurological:      General: No focal deficit present. Mental Status: She is alert and oriented to person, place, and time. Mental status is at baseline. Psychiatric:         Mood and Affect: Mood normal.         Behavior: Behavior normal.         Thought Content: Thought content normal.         Judgment: Judgment normal.              Laboratory:   All laboratory and radiology results have been personally reviewed by myself    Lab Results   Component Value Date     10/16/2018    K 4.4 10/16/2018     10/16/2018    CO2 27 10/16/2018    BUN 14 10/16/2018    CREATININE 0.7 10/16/2018    PROT 8.1 10/16/2018    LABALBU 3.9 10/16/2018    CALCIUM 9.5 10/16/2018    GFRAA >60 10/16/2018    LABGLOM >60 10/16/2018    GLUCOSE 85 10/16/2018    AST 23 10/16/2018    ALT 9 10/16/2018    ALKPHOS 124 10/16/2018    BILITOT 0.3 10/16/2018    TSH 2.620 10/16/2018    VITD25 10 10/16/2018    CHOL 116 10/16/2018    TRIG 68 10/16/2018    HDL 40 10/16/2018    LDLCALC 62 10/16/2018    LABA1C 5.2 10/16/2018        Lab Results   Component Value Date    CHOL 116 10/16/2018     Lab Results   Component Value Date    TRIG 68 10/16/2018 Lab Results   Component Value Date    HDL 40 10/16/2018     Lab Results   Component Value Date    LDLCALC 62 10/16/2018       Lab Results   Component Value Date    LABA1C 5.2 10/16/2018     Lab Results   Component Value Date    LDLCALC 62 10/16/2018    CREATININE 0.7 10/16/2018       ASSESSMENT/PLAN:     Diagnosis Orders   1. Menstrual periods, abnormal  ([facility]) - Live Dumont DO, OB/GYN, [place] (MIHAELA)     She does wish to find a new OB/GYN and I did refer her to Dr. Patricia Garay per patient preference. I do not think that ultrasound at this time will be helpful. She may need to switch OCPs as she continues to have cramping and longer menstrual cycles. Patient is agreeable to the above. Problem list reviewed andsimplified/updated  HM reviewed today and counseled as appropriate    Call or go to ED immediately if symptoms worsen or persist.  No future appointments. Or sooner if necessary. Educational materials and/or homeexercises printed for patient's review and were included in patient instructions on his/her After Visit Summary and given to patient at the end of visit. Counseled regarding above diagnosis, including possible risks and complications,  especially if left uncontrolled. Counseled regarding the possible side effects, risks, benefits and alternatives to treatment; patient and/or guardian verbalizes understanding, agrees,feels comfortable with and wishes to proceed with above treatment plan. Advised patient to call [facility] new medication issues, and read all Rx info from pharmacy to assure aware of all possible risks and side effects of medication before taking. Reviewed age and gender appropriate health screening exams and vaccinations.   Advised patient regarding importance of keeping up with recommended health maintenance and toschedule as soon as possible if overdue, as this is important in assessing for undiagnosed pathology, especially cancer, as well as

## 2021-10-15 ENCOUNTER — HOSPITAL ENCOUNTER (EMERGENCY)
Age: 31
Discharge: HOME | End: 2021-10-16
Payer: COMMERCIAL

## 2021-10-15 ENCOUNTER — APPOINTMENT (OUTPATIENT)
Dept: ULTRASOUND IMAGING | Age: 31
End: 2021-10-15
Payer: COMMERCIAL

## 2021-10-15 VITALS
HEART RATE: 78 BPM | SYSTOLIC BLOOD PRESSURE: 142 MMHG | TEMPERATURE: 97.6 F | OXYGEN SATURATION: 100 % | BODY MASS INDEX: 48.82 KG/M2 | DIASTOLIC BLOOD PRESSURE: 78 MMHG | WEIGHT: 293 LBS | RESPIRATION RATE: 16 BRPM | HEIGHT: 65 IN

## 2021-10-15 DIAGNOSIS — M79.604 RIGHT LEG PAIN: Primary | ICD-10-CM

## 2021-10-15 LAB
HCG, URINE, POC: NEGATIVE
Lab: NORMAL
NEGATIVE QC PASS/FAIL: NORMAL
POSITIVE QC PASS/FAIL: NORMAL

## 2021-10-15 PROCEDURE — 81001 URINALYSIS AUTO W/SCOPE: CPT

## 2021-10-15 PROCEDURE — 99284 EMERGENCY DEPT VISIT MOD MDM: CPT

## 2021-10-15 PROCEDURE — 6370000000 HC RX 637 (ALT 250 FOR IP): Performed by: NURSE PRACTITIONER

## 2021-10-15 PROCEDURE — 93971 EXTREMITY STUDY: CPT

## 2021-10-15 RX ORDER — HYDROCODONE BITARTRATE AND ACETAMINOPHEN 5; 325 MG/1; MG/1
1 TABLET ORAL ONCE
Status: COMPLETED | OUTPATIENT
Start: 2021-10-15 | End: 2021-10-15

## 2021-10-15 RX ADMIN — HYDROCODONE BITARTRATE AND ACETAMINOPHEN 1 TABLET: 5; 325 TABLET ORAL at 23:08

## 2021-10-15 ASSESSMENT — PAIN SCALES - GENERAL
PAINLEVEL_OUTOF10: 6
PAINLEVEL_OUTOF10: 6

## 2021-10-15 ASSESSMENT — PAIN DESCRIPTION - LOCATION: LOCATION: LEG

## 2021-10-15 ASSESSMENT — PAIN DESCRIPTION - ORIENTATION: ORIENTATION: RIGHT

## 2021-10-16 LAB
BACTERIA: ABNORMAL /HPF
BILIRUBIN URINE: NEGATIVE
BLOOD, URINE: NEGATIVE
CLARITY: ABNORMAL
COLOR: YELLOW
EPITHELIAL CELLS, UA: ABNORMAL /HPF
GLUCOSE URINE: NEGATIVE MG/DL
KETONES, URINE: NEGATIVE MG/DL
LEUKOCYTE ESTERASE, URINE: ABNORMAL
NITRITE, URINE: NEGATIVE
PH UA: 6 (ref 5–9)
PROTEIN UA: NEGATIVE MG/DL
RBC UA: ABNORMAL /HPF (ref 0–2)
SPECIFIC GRAVITY UA: 1.02 (ref 1–1.03)
UROBILINOGEN, URINE: 0.2 E.U./DL
WBC UA: ABNORMAL /HPF (ref 0–5)

## 2021-10-16 RX ORDER — IBUPROFEN 800 MG/1
800 TABLET ORAL EVERY 8 HOURS PRN
Qty: 21 TABLET | Refills: 0 | Status: SHIPPED | OUTPATIENT
Start: 2021-10-16 | End: 2021-12-01 | Stop reason: SDUPTHER

## 2021-10-16 NOTE — ED PROVIDER NOTES
[facility]dependent   HPI:  10/15/21, Time: 10:28 PM EDT         Mau Pandya is a 28 y.o. female presenting to the ED for 3-day history of right calf pain. Patient presents emergency department reporting that her right lateral calf has been paining her. Patient denies any recent long travel, denies any chest pain or shortness of breath with this. She states that she is only on progesterone and only has been on that for 10 days. Patient reports otherwise being a non-smoker. She denies any previous history of blood clots or clotting disorders. Denies take anything for pain relief. She also denies any injury or trauma, turning or twisting. Patient also reports that she feels as if she may begin no urinary tract infection, states that she did have some burning but did take Azo and symptoms resolved. Patient otherwise denies any back or flank pain denies any abdominal pain denies any concern for pregnancy or denies concerns for an STD. Symptoms mild severity and persistent  Review of Systems:   A complete review of systems was performed and pertinent positives and negatives are stated within HPI, all other systems reviewed and are negative.          --------------------------------------------- PAST HISTORY ---------------------------------------------  Past Medical History:  has no past medical history on file. Past Surgical History:  has a past surgical history that includes Dilation and curettage of uterus (07/2017). Social History:  reports that she has never smoked. She has never used smokeless tobacco. She reports that she does not drink alcohol and does not use drugs. Family History: family history is not on file. The patients home medications have been reviewed. Allergies: Patient has no known allergies.     -------------------------------------------------- RESULTS -------------------------------------------------  All laboratory and radiology results have been personally reviewed by myself   LABS:  Results for orders placed or performed during the hospital encounter of 10/15/21   Urinalysis   Result Value Ref Range    Color, UA Yellow Straw/Yellow    Clarity, UA SL CLOUDY Clear    Glucose, Ur Negative Negative mg/dL    Bilirubin Urine Negative Negative    Ketones, Urine Negative Negative mg/dL    Specific Gravity, UA 1.020 1.005 - 1.030    Blood, Urine Negative Negative    pH, UA 6.0 5.0 - 9.0    Protein, UA Negative Negative mg/dL    Urobilinogen, Urine 0.2 <2.0 E.U./dL    Nitrite, Urine Negative Negative    Leukocyte Esterase, Urine TRACE (A) Negative   Microscopic Urinalysis   Result Value Ref Range    WBC, UA 0-1 0 - 5 /HPF    RBC, UA NONE 0 - 2 /HPF    Epithelial Cells, UA FEW /HPF    Bacteria, UA FEW (A) None Seen /HPF   POC Pregnancy Urine   Result Value Ref Range    HCG, Urine, POC Negative Negative    Lot Number [id number]     Positive QC Pass/Fail Pass     Negative QC Pass/Fail Pass        RADIOLOGY:  Interpreted by Radiologist.  US DUP LOWER EXTREMITY RIGHT RITO   Final Result   No evidence of DVT in the right lower extremity.             ------------------------- NURSING NOTES AND VITALS REVIEWED ---------------------------   The nursing notes within the ED encounter and vital signs as below have been reviewed. There were no vitals taken for this visit. Oxygen Saturation Interpretation: Normal      ---------------------------------------------------PHYSICAL EXAM--------------------------------------      Constitutional/General: Alert and oriented x3, well appearing, non toxic in NAD  Head: Normocephalic and atraumatic  Eyes: PERRL, EOMI  Mouth: Oropharynx clear, handling secretions, no trismus  Neck: Supple, full ROM,   Pulmonary: Lungs clear to auscultation bilaterally, no wheezes, rales, or rhonchi. Not in respiratory distress  Cardiovascular:  Regular rate and rhythm, no murmurs, gallops, or rubs.  2+ distal pulses  Abdomen: Soft, non tender, non distended,   Extremities: Moves all extremities x 4. Warm and well perfused, compartments soft. 2+ dorsal pedal pulses. Full sensation intact. Skin: warm and dry without rash  Neurologic: GCS 15,  Psych: Normal Affect      ------------------------------ ED COURSE/MEDICAL DECISION MAKING----------------------  Medications   HYDROcodone-acetaminophen (NORCO) 5-325 MG per tablet 1 tablet (1 tablet Oral Given 10/15/21 7682)         ED COURSE:       Medical Decision Making: Plan will be to medicate patient will obtain ultrasound right lower extremity was also obtain urinalysis, urine negative for any urinary tract infection, urine pregnancy negative, ultrasound also completely negative. Patient made aware of results plan will be for discharge home, she was made aware take Motrin or Tylenol. Patient otherwise nontoxic, she not tachycardic. Not hypoxic. Pulse ox 100%, heart rate 78. Patient expressed understanding of good follow-up care as well as when to return back to the emergency department. Counseling: The emergency provider has spoken with the patient and discussed todays results, in addition to providing specific details for the plan of care and counseling regarding the diagnosis and prognosis. Questions are answered at this time and they are agreeable with the plan.      --------------------------------- IMPRESSION AND DISPOSITION ---------------------------------    IMPRESSION  1. Right leg pain        DISPOSITION  Disposition: Discharge to home  Patient condition is good      NOTE: This report was transcribed using voice recognition software.  Every effort was made to ensure accuracy; however, inadvertent computerized transcription errors may be present     RICHARD Meyer CNP  10/16/21 7245

## 2021-11-23 ENCOUNTER — PATIENT MESSAGE (OUTPATIENT)
Dept: FAMILY MEDICINE CLINIC | Age: 31
End: 2021-11-23

## 2021-11-23 ENCOUNTER — TELEPHONE (OUTPATIENT)
Dept: FAMILY MEDICINE CLINIC | Age: 31
End: 2021-11-23

## 2021-11-23 ENCOUNTER — OFFICE VISIT (OUTPATIENT)
Dept: FAMILY MEDICINE CLINIC | Age: 31
End: 2021-11-23
Payer: COMMERCIAL

## 2021-11-23 VITALS
BODY MASS INDEX: 48.82 KG/M2 | WEIGHT: 293 LBS | RESPIRATION RATE: 20 BRPM | HEIGHT: 65 IN | HEART RATE: 89 BPM | DIASTOLIC BLOOD PRESSURE: 94 MMHG | SYSTOLIC BLOOD PRESSURE: 161 MMHG

## 2021-11-23 DIAGNOSIS — I10 PRIMARY HYPERTENSION: Primary | ICD-10-CM

## 2021-11-23 DIAGNOSIS — N76.0 BV (BACTERIAL VAGINOSIS): ICD-10-CM

## 2021-11-23 DIAGNOSIS — E66.01 MORBID OBESITY DUE TO EXCESS CALORIES (HCC): ICD-10-CM

## 2021-11-23 DIAGNOSIS — D50.9 IRON DEFICIENCY ANEMIA, UNSPECIFIED IRON DEFICIENCY ANEMIA TYPE: ICD-10-CM

## 2021-11-23 DIAGNOSIS — B96.89 BV (BACTERIAL VAGINOSIS): ICD-10-CM

## 2021-11-23 PROCEDURE — G8417 CALC BMI ABV UP PARAM F/U: HCPCS | Performed by: FAMILY MEDICINE

## 2021-11-23 PROCEDURE — G8484 FLU IMMUNIZE NO ADMIN: HCPCS | Performed by: FAMILY MEDICINE

## 2021-11-23 PROCEDURE — G8427 DOCREV CUR MEDS BY ELIG CLIN: HCPCS | Performed by: FAMILY MEDICINE

## 2021-11-23 PROCEDURE — 81025 URINE PREGNANCY TEST: CPT | Performed by: FAMILY MEDICINE

## 2021-11-23 PROCEDURE — 81002 URINALYSIS NONAUTO W/O SCOPE: CPT | Performed by: FAMILY MEDICINE

## 2021-11-23 PROCEDURE — 99214 OFFICE O/P EST MOD 30 MIN: CPT | Performed by: FAMILY MEDICINE

## 2021-11-23 PROCEDURE — 1036F TOBACCO NON-USER: CPT | Performed by: FAMILY MEDICINE

## 2021-11-23 RX ORDER — AMLODIPINE BESYLATE 5 MG/1
5 TABLET ORAL DAILY
Qty: 30 TABLET | Refills: 5 | Status: SHIPPED
Start: 2021-11-23 | End: 2021-12-22

## 2021-11-23 NOTE — TELEPHONE ENCOUNTER
From: Cash Faustin  To: Dr. Juliana Arias: 11/23/2021 2:03 PM EST  Subject: Blood pressure     Hello I want this message to go to the doctor, and would like a response or call from her please. I Called your office this morning in regards to my elevated blood pressure. Last night it was 187-100. This morning it was 168-112, I had a piece of Wheat toast to eat. This afternoon, recently it was 173-111, I had hot tea and another piece of wheat toast prior to taking my blood pressure. I feel okay, sort of feel stuffy in my head as if its sinus, but not really a headache. Other then that, I feel okay. I made an appointment for tomorrow Morning with you, however if you think things could be addressed via telephone or virtual visit that would work Better for me. But, I will do whatever you say. Im concerned about my blood pressure, its never been this way before. I know you are busy, if you could just call me anytime today or message me back I would appreciate it greatly.     Thank you

## 2021-11-23 NOTE — TELEPHONE ENCOUNTER
Pt calling and states her BP yesterday was 187/100 and today was 168/112. She made an appt for tomorrow morning. Please advise what to do until then.

## 2021-11-23 NOTE — TELEPHONE ENCOUNTER
Sent note via [employer] - if can come in today would prefer if not may keep appointment tomorrow

## 2021-11-24 LAB
BILIRUBIN, POC: NEGATIVE
BLOOD URINE, POC: NEGATIVE
CLARITY, POC: NORMAL
COLOR, POC: NORMAL
CONTROL: NORMAL
GLUCOSE URINE, POC: NEGATIVE
KETONES, POC: NEGATIVE
LEUKOCYTE EST, POC: NEGATIVE
NITRITE, POC: NEGATIVE
PH, POC: 5
PREGNANCY TEST URINE, POC: NEGATIVE
PROTEIN, POC: NEGATIVE
SPECIFIC GRAVITY, POC: 1.02
UROBILINOGEN, POC: NORMAL

## 2021-11-24 ASSESSMENT — ENCOUNTER SYMPTOMS
ABDOMINAL PAIN: 0
SHORTNESS OF BREATH: 0
SORE THROAT: 0
EYE PAIN: 0
SINUS PRESSURE: 0
DIARRHEA: 0
COUGH: 0
BACK PAIN: 0
CONSTIPATION: 0

## 2021-12-01 RX ORDER — IBUPROFEN 800 MG/1
800 TABLET ORAL EVERY 8 HOURS PRN
Qty: 21 TABLET | Refills: 0 | Status: SHIPPED
Start: 2021-12-01 | End: 2022-05-07 | Stop reason: SDUPTHER

## 2021-12-22 ENCOUNTER — VIRTUAL VISIT (OUTPATIENT)
Dept: FAMILY MEDICINE CLINIC | Age: 31
End: 2021-12-22
Payer: COMMERCIAL

## 2021-12-22 DIAGNOSIS — I10 PRIMARY HYPERTENSION: Primary | ICD-10-CM

## 2021-12-22 PROCEDURE — G8427 DOCREV CUR MEDS BY ELIG CLIN: HCPCS | Performed by: FAMILY MEDICINE

## 2021-12-22 PROCEDURE — 99213 OFFICE O/P EST LOW 20 MIN: CPT | Performed by: FAMILY MEDICINE

## 2021-12-22 RX ORDER — AMLODIPINE BESYLATE 10 MG/1
10 TABLET ORAL DAILY
Qty: 30 TABLET | Refills: 2 | Status: SHIPPED
Start: 2021-12-22 | End: 2022-03-15

## 2021-12-22 ASSESSMENT — ENCOUNTER SYMPTOMS
EYE PAIN: 0
ABDOMINAL PAIN: 0
SHORTNESS OF BREATH: 0
BACK PAIN: 0
COUGH: 0
CONSTIPATION: 0
SINUS PRESSURE: 0
DIARRHEA: 0
SORE THROAT: 0

## 2021-12-22 NOTE — PROGRESS NOTES
This visit was performed as a virtual video visit using a synchronous, two-way, audio-video telehealth technology platform. Patient identification was verified at the start of the visit, including the patient's telephone number and physical location. I discussed with the patient the nature of our telehealth visits, that:     1. Due to the nature of an audio- video modality, the only components of a physical exam that could be done are the elements supported by direct observation. 2. I would evaluate the patient and recommend diagnostics and treatments based on my assessment. 3. If it was felt that the patient should be evaluated in clinic or an emergency room setting, then they would be directed there. 4. Our sessions are not being recorded and that personal health information is protected. 5. Our team would provide follow up care in person if/when the patient needs it. Patient does agree to proceed with telemedicine consultation. Patient's location: home address in [facility]    Physician  location [place] [address] office   other people involved in call: none        Time spent: Greater than Not billed by time    This visit was completed virtually using Doxy. me        Karon Kevin  : 1990    Chief Complaint:     Chief Complaint   Patient presents with    Hypertension     Wants to discuss BP issues        [facility] 28 y.o. presents for   Chief Complaint   Patient presents with    Hypertension     Wants to discuss BP issues      She has been checking her BP at home and it has been high. She has been taking her medication as prescribed. She has no current complaints. She denies any side effects to the medication. Her headaches are improving. All questions were answered to patients satisfaction. No past medical history on file.     Past Surgical History:   Procedure Laterality Date    DILATION AND CURETTAGE OF UTERUS  2017    for abnormal uterine bleeding       Social History Socioeconomic History    Marital status: Single     Spouse name: Not on file    Number of children: Not on file    Years of education: Not on file    Highest education level: Not on file   Occupational History    Not on file   Tobacco Use    Smoking status: Never Smoker    Smokeless tobacco: Never Used   Substance and Sexual Activity    Alcohol use: No    Drug use: No    Sexual activity: Yes     Partners: Male     Comment: commited relationship   Other Topics Concern    Not on file   Social History Narrative    Not on file     Social Determinants of Health     Financial Resource Strain: Low Risk     Difficulty of Paying Living Expenses: Not hard at all   Food Insecurity: No Food Insecurity    Worried About [address] [employer] in the Last Year: Never true    [facility] [employer] in the Last Year: Never true   Transportation Needs:     Lack of Transportation (Medical): Not on file    Lack of Transportation (Non-Medical): Not on file   Physical Activity:     Days of Exercise per Week: Not on file    Minutes of Exercise per Session: Not on file   Stress:     Feeling of Stress : Not on file   Social Connections:     Frequency of Communication with Friends and Family: Not on file    Frequency of Social Gatherings with Friends and Family: Not on file    Attends [employer] Services: Not on file    Active Member of [address] [place] [address] [employer] or Organizations: Not on file    Attends Club or Organization Meetings: Not on file    Marital Status: Not on file   Intimate Partner Violence:     Fear of Current or Ex-Partner: Not on file    Emotionally Abused: Not on file    Physically Abused: Not on file    Sexually Abused: Not on file   Housing Stability:     Unable to Pay for Housing in the Last Year: Not on file    Number of [place] in the Last Year: Not on file    Unstable Housing in the Last Year: Not on file       No family history on file.        Current Outpatient Medications   Medication Sig Dispense Refill  amLODIPine (NORVASC) 10 MG tablet Take 1 tablet by mouth daily 30 tablet 2    metroNIDAZOLE (FLAGYL) 500 MG tablet Take 1 tablet by mouth 2 times daily for 7 days 14 tablet 0    ibuprofen (IBU) 800 MG tablet Take 1 tablet by mouth every 8 hours as needed for Pain 21 tablet 0    medroxyPROGESTERone (PROVERA) 10 MG tablet Take 1 tablet by mouth daily 10 tablet 2    medroxyPROGESTERone (PROVERA) 10 MG tablet Take 1 tablet by mouth daily 10 tablet 0     No current facility-administered medications for this visit. No Known Allergies    Health Maintenance Due   Topic Date Due    Hepatitis C screen  Never done    Hepatitis B vaccine (2 of 3 - 3-dose primary series) 06/20/2001    HIV screen  Never done    DTaP/Tdap/Td vaccine (1 - Tdap) Never done    Flu vaccine (1) Never done           REVIEW OF SYSTEMS  Review of Systems   Constitutional: Negative for fatigue and fever. HENT: Negative for ear pain, sinus pressure, sneezing and sore throat. Eyes: Negative for pain. Respiratory: Negative for cough and shortness of breath. Cardiovascular: Negative for chest pain and leg swelling. Gastrointestinal: Negative for abdominal pain, constipation and diarrhea. Genitourinary: Negative for dysuria and urgency. Musculoskeletal: Negative for back pain and myalgias. Skin: Negative for rash. Allergic/Immunologic: Negative for food allergies. Neurological: Negative for light-headedness and headaches. Hematological: Does not bruise/bleed easily. Psychiatric/Behavioral: Negative for behavioral problems and sleep disturbance. PHYSICAL EXAM  There were no vitals taken for this visit. Physical Exam  Vitals reviewed. Constitutional:       Appearance: Normal appearance. She is obese. HENT:      Head: Normocephalic. Nose: Nose normal.   Eyes:      Extraocular Movements: Extraocular movements intact.       Conjunctiva/sclera: Conjunctivae normal.   Pulmonary:      Effort: Pulmonary effort is normal.   Musculoskeletal:         General: Normal range of motion. Cervical back: Normal range of motion. Skin:     Findings: No rash. Neurological:      General: No focal deficit present. Mental Status: She is alert and oriented to person, place, and time. Mental status is at baseline. Psychiatric:         Mood and Affect: Mood normal.         Behavior: Behavior normal.         Thought Content: Thought content normal.         Judgment: Judgment normal.              Laboratory: All laboratory and radiology results have been personally reviewed by myself    Lab Results   Component Value Date     10/16/2018    K 4.4 10/16/2018     10/16/2018    CO2 27 10/16/2018    BUN 14 10/16/2018    CREATININE 0.7 10/16/2018    PROT 8.1 10/16/2018    LABALBU 3.9 10/16/2018    CALCIUM 9.5 10/16/2018    GFRAA >60 10/16/2018    LABGLOM >60 10/16/2018    GLUCOSE 85 10/16/2018    AST 23 10/16/2018    ALT 9 10/16/2018    ALKPHOS 124 10/16/2018    BILITOT 0.3 10/16/2018    TSH 2.620 10/16/2018    VITD25 10 10/16/2018    CHOL 116 10/16/2018    TRIG 68 10/16/2018    HDL 40 10/16/2018    LDLCALC 62 10/16/2018    LABA1C 5.2 10/16/2018        Lab Results   Component Value Date    CHOL 116 10/16/2018     Lab Results   Component Value Date    TRIG 68 10/16/2018     Lab Results   Component Value Date    HDL 40 10/16/2018     Lab Results   Component Value Date    LDLCALC 62 10/16/2018       Lab Results   Component Value Date    LABA1C 5.2 10/16/2018     Lab Results   Component Value Date    LDLCALC 62 10/16/2018    CREATININE 0.7 10/16/2018       ASSESSMENT/PLAN:     Diagnosis Orders   1. Primary hypertension       BP still high with home readings will increase amlodipine to 10 mg. Side effects of medication were reviewed with patient. She will call in 2 weeks with BP readings.      Problem list reviewed andsimplified/updated  HM reviewed today and counseled as appropriate    Call or go to ED immediately if symptoms worsen or persist.  Future Appointments   Date Time Provider Quique Baum   12/22/2021 12:15 PM Lindsey 6DO [facility]   1/12/2022 10:10 AM SCHEDULE, [facility]   2/10/2022  1:20 PM Meka Wilkerson, DO [facility]     Or sooner if necessary. Educational materials and/or homeexercises printed for patient's review and were included in patient instructions on his/her After Visit Summary and given to patient at the end of visit. Counseled regarding above diagnosis, including possible risks and complications,  especially if left uncontrolled. Counseled regarding the possible side effects, risks, benefits and alternatives to treatment; patient and/or guardian verbalizes understanding, agrees,feels comfortable with and wishes to proceed with above treatment plan. Advised patient to call Trinidad Book new medication issues, and read all Rx info from pharmacy to assure aware of all possible risks and side effects of medication before taking. Reviewed age and gender appropriate health screening exams and vaccinations. Advised patient regarding importance of keeping up with recommended health maintenance and toschedule as soon as possible if overdue, as this is important in assessing for undiagnosed pathology, especially cancer, as well as protecting against potentially harmful/life threatening disease. Patient and/or guardian verbalizes understanding and agrees with above counseling, assessment and plan. All questions answered. Lindsey Yates   12/22/21    NOTE: This report was transcribed using voice recognition software. Every effort was made to ensure accuracy; however, inadvertent computerized transcription errors may be present    Anjel Gallagher is a 28 y.o. female being evaluated by a Virtual Visit (video visit) encounter to address concerns as mentioned above. A caregiver was present when appropriate.  Due

## 2022-01-01 NOTE — PROGRESS NOTES
Eovnne Dumont  : 1990    Chief Complaint:     Chief Complaint   Patient presents with    Hypertension       HPI  Evonne Dumont 28 y.o. presents for   Chief Complaint   Patient presents with    Hypertension     Patient presents for elevated BP. She has been checking her pressure for the last couple days and has been in the 471H and 106O systolic. She denies any dizziness, lightheadedness, chest pain, shortness of breath. She is going to start a diet to try to help lose weight. Does follow with gynecology. Sure her last period was last month. However she does have a regular menstrual cycles. She has had labs from her gynecologist which have been normal.  Last pregnancy test was in October and was negative. She has never been on blood pressure medication prior. All questions were answered to patients satisfaction. History reviewed. No pertinent past medical history. No Known Allergies    Health Maintenance Due   Topic Date Due    Hepatitis C screen  Never done    Hepatitis B vaccine (2 of 3 - 3-dose primary series) 2001    HIV screen  Never done    DTaP/Tdap/Td vaccine (1 - Tdap) Never done    Flu vaccine (1) Never done         REVIEW OF SYSTEMS  Review of Systems   Constitutional: Negative for fatigue and fever. HENT: Negative for ear pain, sinus pressure, sneezing and sore throat. Eyes: Negative for pain. Respiratory: Negative for cough and shortness of breath. Cardiovascular: Negative for chest pain and leg swelling. Gastrointestinal: Negative for abdominal pain, constipation and diarrhea. Genitourinary: Negative for dysuria and urgency. Musculoskeletal: Negative for back pain and myalgias. Skin: Negative for rash. Allergic/Immunologic: Negative for food allergies. Neurological: Negative for light-headedness and headaches. Hematological: Does not bruise/bleed easily. Psychiatric/Behavioral: Negative for behavioral problems and sleep disturbance. PHYSICAL EXAM  BP (!) 161/94   Pulse 89   Resp 20   Ht 5' 5\" (1.651 m)   Wt (!) 353 lb (160.1 kg)   LMP 10/25/2021 (Approximate)   BMI 58.74 kg/m²   Physical Exam  Vitals and nursing note reviewed. Constitutional:       Appearance: She is well-developed. She is obese. HENT:      Head: Normocephalic and atraumatic. Right Ear: External ear normal.      Left Ear: External ear normal.      Nose: Nose normal.   Eyes:      Conjunctiva/sclera: Conjunctivae normal.   Neck:      Thyroid: No thyromegaly. Cardiovascular:      Rate and Rhythm: Normal rate and regular rhythm. Heart sounds: Normal heart sounds. No murmur heard. Pulmonary:      Effort: Pulmonary effort is normal.      Breath sounds: Normal breath sounds. No wheezing. Abdominal:      Palpations: Abdomen is soft. Tenderness: There is no abdominal tenderness. Musculoskeletal:         General: No tenderness. Normal range of motion. Cervical back: Normal range of motion and neck supple. Lymphadenopathy:      Cervical: No cervical adenopathy. Skin:     General: Skin is warm and dry. Findings: No rash. Neurological:      General: No focal deficit present. Mental Status: She is alert and oriented to person, place, and time. Deep Tendon Reflexes: Reflexes are normal and symmetric. Psychiatric:         Mood and Affect: Mood normal.         Behavior: Behavior normal.              Laboratory:   All laboratory and radiology results have been personally reviewed by myself    Lab Results   Component Value Date     10/16/2018    K 4.4 10/16/2018     10/16/2018    CO2 27 10/16/2018    BUN 14 10/16/2018    CREATININE 0.7 10/16/2018    PROT 8.1 10/16/2018    LABALBU 3.9 10/16/2018    CALCIUM 9.5 10/16/2018    GFRAA >60 10/16/2018    LABGLOM >60 10/16/2018    GLUCOSE 85 10/16/2018    AST 23 10/16/2018    ALT 9 10/16/2018    ALKPHOS 124 10/16/2018    BILITOT 0.3 10/16/2018    TSH 2.620 10/16/2018 VITD25 10 10/16/2018    CHOL 116 10/16/2018    TRIG 68 10/16/2018    HDL 40 10/16/2018    LDLCALC 62 10/16/2018    LABA1C 5.2 10/16/2018        Lab Results   Component Value Date    CHOL 116 10/16/2018     Lab Results   Component Value Date    TRIG 68 10/16/2018     Lab Results   Component Value Date    HDL 40 10/16/2018     Lab Results   Component Value Date    LDLCALC 62 10/16/2018       Lab Results   Component Value Date    LABA1C 5.2 10/16/2018     Lab Results   Component Value Date    LDLCALC 62 10/16/2018    CREATININE 0.7 10/16/2018       ASSESSMENT/PLAN:    1. Primary hypertension  -     POCT urine pregnancy  -     TSH without Reflex; Future  -     COMPREHENSIVE METABOLIC PANEL; Future  2. Iron deficiency anemia, unspecified iron deficiency anemia type  -     Iron and TIBC; Future  -     CBC Auto Differential; Future  3. BV (bacterial vaginosis)  -     POCT Urinalysis no Micro  4. Morbid obesity due to excess calories ([address]     Pregnancy test negative today. Recommend to start low-dose amlodipine. Side effects medication reviewed with patient. Labs to be completed as above as well. Patient does have a history of BV wishes for urinalysis to be completed as well. Urinalysis was reviewed and negative. We did discuss losing weight for better health and she could possibly come off the blood pressure medication. We will have patient return in 2 weeks for BP check or sooner if needed. Problem list reviewed andsimplified/updated  HM reviewed today and counseled as appropriate    Call or go to ED immediately if symptoms worsen or persist.  Future Appointments   Date Time Provider Quique Baum   12/9/2021  3:45 PM DO Lou Demarco [facility]   2/10/2022  3:40 PM DO MARTHA [facility]     Or sooner if necessary.       Educational materials and/or homeexercises printed for patient's review and were included in patient instructions on his/her After Visit Summary and given to patient at the end of visit. Counseled regarding above diagnosis, including possible risks and complications,  especially if left uncontrolled. Counseled regarding the possible side effects, risks, benefits and alternatives to treatment; patient and/or guardian verbalizes understanding, agrees,feels comfortable with and wishes to proceed with above treatment plan. Advised patient to call Rose Franco new medication issues, and read all Rx info from pharmacy to assure aware of all possible risks and side effects of medication before taking. Reviewed age and gender appropriate health screening exams and vaccinations. Advised patient regarding importance of keeping up with recommended health maintenance and toschedule as soon as possible if overdue, as this is important in assessing for undiagnosed pathology, especially cancer, as well as protecting against potentially harmful/life threatening disease. and/or guardian verbalizes understanding and agrees with above counseling, assessment and plan. All questions answered. Leo Ball DO  11/23/21    NOTE: This report was transcribed using voice recognition software.  Every effort was made to ensure accuracy; however, inadvertent computerized transcription errors may be present (1) Other Medications/None

## 2022-01-12 ENCOUNTER — HOSPITAL ENCOUNTER (OUTPATIENT)
Age: 32
Discharge: HOME | End: 2022-01-14

## 2022-01-12 PROCEDURE — 88305 TISSUE EXAM BY PATHOLOGIST: CPT

## 2022-03-15 RX ORDER — AMLODIPINE BESYLATE 10 MG/1
TABLET ORAL
Qty: 30 TABLET | Refills: 2 | Status: SHIPPED | OUTPATIENT
Start: 2022-03-15

## 2022-05-10 RX ORDER — IBUPROFEN 800 MG/1
800 TABLET ORAL EVERY 8 HOURS PRN
Qty: 21 TABLET | Refills: 0 | Status: SHIPPED | OUTPATIENT
Start: 2022-05-10 | End: 2022-05-17